# Patient Record
Sex: MALE | Race: WHITE | NOT HISPANIC OR LATINO | Employment: OTHER | URBAN - METROPOLITAN AREA
[De-identification: names, ages, dates, MRNs, and addresses within clinical notes are randomized per-mention and may not be internally consistent; named-entity substitution may affect disease eponyms.]

---

## 2024-01-02 ENCOUNTER — OFFICE VISIT (OUTPATIENT)
Dept: FAMILY MEDICINE | Facility: CLINIC | Age: 69
End: 2024-01-02
Payer: MEDICARE

## 2024-01-02 ENCOUNTER — LAB VISIT (OUTPATIENT)
Dept: LAB | Facility: CLINIC | Age: 69
End: 2024-01-02
Payer: MEDICARE

## 2024-01-02 VITALS
TEMPERATURE: 98 F | DIASTOLIC BLOOD PRESSURE: 70 MMHG | SYSTOLIC BLOOD PRESSURE: 130 MMHG | HEIGHT: 72 IN | OXYGEN SATURATION: 99 % | WEIGHT: 210.63 LBS | HEART RATE: 65 BPM | BODY MASS INDEX: 28.53 KG/M2

## 2024-01-02 DIAGNOSIS — I10 ESSENTIAL HYPERTENSION, BENIGN: ICD-10-CM

## 2024-01-02 DIAGNOSIS — Z00.00 PREVENTATIVE HEALTH CARE: ICD-10-CM

## 2024-01-02 DIAGNOSIS — Z12.12 SCREENING FOR MALIGNANT NEOPLASM OF THE RECTUM: ICD-10-CM

## 2024-01-02 DIAGNOSIS — M54.32 SCIATICA OF LEFT SIDE: ICD-10-CM

## 2024-01-02 DIAGNOSIS — Z88.9 DRUG ALLERGY: ICD-10-CM

## 2024-01-02 DIAGNOSIS — E78.2 MIXED HYPERLIPIDEMIA: ICD-10-CM

## 2024-01-02 DIAGNOSIS — R73.9 ELEVATED BLOOD SUGAR: ICD-10-CM

## 2024-01-02 DIAGNOSIS — E78.2 MIXED HYPERLIPIDEMIA: Primary | ICD-10-CM

## 2024-01-02 DIAGNOSIS — Z11.59 ENCOUNTER FOR HEPATITIS C SCREENING TEST FOR LOW RISK PATIENT: ICD-10-CM

## 2024-01-02 DIAGNOSIS — N40.0 BENIGN PROSTATIC HYPERPLASIA WITHOUT LOWER URINARY TRACT SYMPTOMS: ICD-10-CM

## 2024-01-02 LAB
ALBUMIN SERPL BCP-MCNC: 4.6 G/DL (ref 3.5–5.2)
ALBUMIN/CREAT UR: 5 UG/MG (ref 0–30)
ALP SERPL-CCNC: 79 U/L (ref 55–135)
ALT SERPL W/O P-5'-P-CCNC: 22 U/L (ref 10–44)
ANION GAP SERPL CALC-SCNC: 11 MMOL/L (ref 8–16)
AST SERPL-CCNC: 27 U/L (ref 10–40)
BILIRUB SERPL-MCNC: 0.6 MG/DL (ref 0.1–1)
BUN SERPL-MCNC: 11 MG/DL (ref 8–23)
CALCIUM SERPL-MCNC: 9.4 MG/DL (ref 8.7–10.5)
CHLORIDE SERPL-SCNC: 101 MMOL/L (ref 95–110)
CHOLEST SERPL-MCNC: 185 MG/DL (ref 120–199)
CHOLEST/HDLC SERPL: 3.1 {RATIO} (ref 2–5)
CO2 SERPL-SCNC: 25 MMOL/L (ref 23–29)
COMPLEXED PSA SERPL-MCNC: 0.69 NG/ML (ref 0–4)
CREAT SERPL-MCNC: 1.2 MG/DL (ref 0.5–1.4)
CREAT UR-MCNC: 101 MG/DL (ref 23–375)
EST. GFR  (NO RACE VARIABLE): >60 ML/MIN/1.73 M^2
ESTIMATED AVG GLUCOSE: 105 MG/DL (ref 68–131)
GLUCOSE SERPL-MCNC: 101 MG/DL (ref 70–110)
HBA1C MFR BLD: 5.3 % (ref 4–5.6)
HCV AB SERPL QL IA: NORMAL
HDLC SERPL-MCNC: 59 MG/DL (ref 40–75)
HDLC SERPL: 31.9 % (ref 20–50)
LDLC SERPL CALC-MCNC: 108 MG/DL (ref 63–159)
MICROALBUMIN UR DL<=1MG/L-MCNC: 5 UG/ML
NONHDLC SERPL-MCNC: 126 MG/DL
POTASSIUM SERPL-SCNC: 4.1 MMOL/L (ref 3.5–5.1)
PROT SERPL-MCNC: 7.5 G/DL (ref 6–8.4)
SODIUM SERPL-SCNC: 137 MMOL/L (ref 136–145)
TRIGL SERPL-MCNC: 90 MG/DL (ref 30–150)

## 2024-01-02 PROCEDURE — 86803 HEPATITIS C AB TEST: CPT | Performed by: INTERNAL MEDICINE

## 2024-01-02 PROCEDURE — 99214 OFFICE O/P EST MOD 30 MIN: CPT | Mod: S$GLB,,, | Performed by: INTERNAL MEDICINE

## 2024-01-02 PROCEDURE — 83036 HEMOGLOBIN GLYCOSYLATED A1C: CPT | Performed by: INTERNAL MEDICINE

## 2024-01-02 PROCEDURE — 3008F BODY MASS INDEX DOCD: CPT | Mod: CPTII,S$GLB,, | Performed by: INTERNAL MEDICINE

## 2024-01-02 PROCEDURE — 36415 COLL VENOUS BLD VENIPUNCTURE: CPT | Mod: ,,, | Performed by: INTERNAL MEDICINE

## 2024-01-02 PROCEDURE — 80061 LIPID PANEL: CPT | Performed by: INTERNAL MEDICINE

## 2024-01-02 PROCEDURE — 4010F ACE/ARB THERAPY RXD/TAKEN: CPT | Mod: CPTII,S$GLB,, | Performed by: INTERNAL MEDICINE

## 2024-01-02 PROCEDURE — 3078F DIAST BP <80 MM HG: CPT | Mod: CPTII,S$GLB,, | Performed by: INTERNAL MEDICINE

## 2024-01-02 PROCEDURE — 3075F SYST BP GE 130 - 139MM HG: CPT | Mod: CPTII,S$GLB,, | Performed by: INTERNAL MEDICINE

## 2024-01-02 PROCEDURE — 82043 UR ALBUMIN QUANTITATIVE: CPT | Performed by: INTERNAL MEDICINE

## 2024-01-02 PROCEDURE — 1160F RVW MEDS BY RX/DR IN RCRD: CPT | Mod: CPTII,S$GLB,, | Performed by: INTERNAL MEDICINE

## 2024-01-02 PROCEDURE — 1159F MED LIST DOCD IN RCRD: CPT | Mod: CPTII,S$GLB,, | Performed by: INTERNAL MEDICINE

## 2024-01-02 PROCEDURE — 1126F AMNT PAIN NOTED NONE PRSNT: CPT | Mod: CPTII,S$GLB,, | Performed by: INTERNAL MEDICINE

## 2024-01-02 PROCEDURE — 80053 COMPREHEN METABOLIC PANEL: CPT | Performed by: INTERNAL MEDICINE

## 2024-01-02 PROCEDURE — 84153 ASSAY OF PSA TOTAL: CPT | Performed by: INTERNAL MEDICINE

## 2024-01-02 RX ORDER — LISINOPRIL 10 MG/1
10 TABLET ORAL DAILY
COMMUNITY

## 2024-01-02 RX ORDER — METOPROLOL SUCCINATE 100 MG/1
100 TABLET, EXTENDED RELEASE ORAL DAILY
COMMUNITY

## 2024-01-02 NOTE — ASSESSMENT & PLAN NOTE
We discussed vaccinations  He got Flu , shingles already  I recommended RSV, prevnar 20, COVID & Tdap  Order cologuard  Get lipids, PSA & A1c

## 2024-01-02 NOTE — PROGRESS NOTES
Subjective:       Patient ID: German Casey is a 68 y.o. male.    Chief Complaint: Annual Exam, Establish Care, and Back Pain      Patient is established already .......... presents today for a f/u visit , to discuss getting new labs  and for management of the chronic conditions , Sciatica         Back Pain  This is a chronic problem. The current episode started more than 1 year ago. The problem occurs intermittently. The problem has been gradually worsening since onset. The pain is present in the lumbar spine. The quality of the pain is described as aching. The pain radiates to the left knee, left thigh and left foot. Pertinent negatives include no fever. (Weak L foot) Risk factors include lack of exercise, poor posture and sedentary lifestyle. He has tried NSAIDs for the symptoms. The treatment provided mild relief.     Review of Systems   Constitutional:  Negative for activity change, appetite change and fever.   Respiratory: Negative.     Cardiovascular: Negative.    Gastrointestinal: Negative.    Musculoskeletal:  Positive for back pain.         Objective:      Physical Exam  Vitals and nursing note reviewed.   Constitutional:       Appearance: Normal appearance.   Cardiovascular:      Rate and Rhythm: Normal rate and regular rhythm.      Pulses: Normal pulses.      Heart sounds: Normal heart sounds. No murmur heard.     No friction rub. No gallop.   Pulmonary:      Effort: Pulmonary effort is normal.      Breath sounds: Normal breath sounds. No wheezing.   Abdominal:      General: Abdomen is flat. Bowel sounds are normal.      Palpations: Abdomen is soft.   Musculoskeletal:         General: Normal range of motion.   Skin:     General: Skin is warm and dry.   Neurological:      General: No focal deficit present.      Mental Status: He is alert and oriented to person, place, and time.   Psychiatric:         Mood and Affect: Mood normal.         Assessment:       1. Mixed hyperlipidemia  -     Lipid Panel;  Future; Expected date: 01/02/2024    2. Essential hypertension, benign  Overview:  Well controlled on ace inh , B blocker    Assessment & Plan:  Diet , wt loss , exercise d/w patient  Cont POC  [facility]    Orders:  -     Comprehensive Metabolic Panel; Future; Expected date: 01/02/2024  -     Microalbumin/Creatinine Ratio, Urine; Future; Expected date: 01/02/2024    3. Benign prostatic hyperplasia without lower urinary tract symptoms  -     Prostate Specific Antigen, Diagnostic; Future; Expected date: 01/02/2024    4. Encounter for hepatitis C screening test for low risk patient  -     Hepatitis C Antibody; Future; Expected date: 01/02/2024    5. Elevated blood sugar  -     Hemoglobin A1C; Standing    6. Screening for malignant neoplasm of the rectum  -     Cologuard Screening (Multitarget Stool DNA); Future; Expected date: 01/02/2024    7. Sciatica of left side  Overview:  No previous w/u    Assessment & Plan:  Refer to n/s b/o h/o drop foot  Motrin  Heating pads  Voltaren gel qid prn OTC    Orders:  -     Ambulatory referral/consult to Neurosurgery; Future; Expected date: 01/09/2024    8. Drug allergy  -     Toxicology screen, urine    9. Preventative health care  Overview:  Get AWV next    Assessment & Plan:  We discussed vaccinations  He got Flu , shingles already  I recommended RSV, prevnar 20, COVID & Tdap  Order cologuard  Get lipids, PSA & A1c             Plan:       1. Mixed hyperlipidemia  -     Lipid Panel; Future; Expected date: 01/02/2024    2. Essential hypertension, benign  Overview:  Well controlled on ace inh , B blocker    Assessment & Plan:  Diet , wt loss , exercise d/w patient  Cont POC  [facility]    Orders:  -     Comprehensive Metabolic Panel; Future; Expected date: 01/02/2024  -     Microalbumin/Creatinine Ratio, Urine; Future; Expected date: 01/02/2024    3. Benign prostatic hyperplasia without lower urinary tract symptoms  -     Prostate Specific Antigen, Diagnostic;  Future; Expected date: 01/02/2024    4. Encounter for hepatitis C screening test for low risk patient  -     Hepatitis C Antibody; Future; Expected date: 01/02/2024    5. Elevated blood sugar  -     Hemoglobin A1C; Standing    6. Screening for malignant neoplasm of the rectum  -     Cologuard Screening (Multitarget Stool DNA); Future; Expected date: 01/02/2024    7. Sciatica of left side  Overview:  No previous w/u    Assessment & Plan:  Refer to n/s b/o h/o drop foot  Motrin  Heating pads  Voltaren gel qid prn OTC    Orders:  -     Ambulatory referral/consult to Neurosurgery; Future; Expected date: 01/09/2024    8. Drug allergy  -     Toxicology screen, urine    9. Preventative health care  Overview:  Get AWV next    Assessment & Plan:  We discussed vaccinations  He got Flu , shingles already  I recommended RSV, prevnar 20, COVID & Tdap  Order cologuard  Get lipids, PSA & A1c

## 2024-01-08 ENCOUNTER — PATIENT MESSAGE (OUTPATIENT)
Dept: FAMILY MEDICINE | Facility: CLINIC | Age: 69
End: 2024-01-08
Payer: MEDICARE

## 2024-01-22 LAB — NONINV COLON CA DNA+OCC BLD SCRN STL QL: NEGATIVE

## 2024-02-02 ENCOUNTER — OFFICE VISIT (OUTPATIENT)
Dept: FAMILY MEDICINE | Facility: CLINIC | Age: 69
End: 2024-02-02
Payer: MEDICARE

## 2024-02-02 VITALS
DIASTOLIC BLOOD PRESSURE: 78 MMHG | BODY MASS INDEX: 27.66 KG/M2 | WEIGHT: 204.19 LBS | HEIGHT: 72 IN | SYSTOLIC BLOOD PRESSURE: 128 MMHG

## 2024-02-02 DIAGNOSIS — Z00.00 ENCOUNTER FOR ANNUAL WELLNESS VISIT (AWV) IN MEDICARE PATIENT: ICD-10-CM

## 2024-02-02 DIAGNOSIS — I10 ESSENTIAL HYPERTENSION, BENIGN: ICD-10-CM

## 2024-02-02 DIAGNOSIS — Z23 NEED FOR PROPHYLACTIC VACCINATION WITH COMBINED DIPHTHERIA-TETANUS-PERTUSSIS (DTP) VACCINE: Primary | ICD-10-CM

## 2024-02-02 DIAGNOSIS — Z86.711 HISTORY OF PULMONARY EMBOLISM: ICD-10-CM

## 2024-02-02 PROCEDURE — 3044F HG A1C LEVEL LT 7.0%: CPT | Mod: CPTII,S$GLB,, | Performed by: INTERNAL MEDICINE

## 2024-02-02 PROCEDURE — 1126F AMNT PAIN NOTED NONE PRSNT: CPT | Mod: CPTII,S$GLB,, | Performed by: INTERNAL MEDICINE

## 2024-02-02 PROCEDURE — 90471 IMMUNIZATION ADMIN: CPT | Mod: S$GLB,,, | Performed by: INTERNAL MEDICINE

## 2024-02-02 PROCEDURE — 3061F NEG MICROALBUMINURIA REV: CPT | Mod: CPTII,S$GLB,, | Performed by: INTERNAL MEDICINE

## 2024-02-02 PROCEDURE — 4010F ACE/ARB THERAPY RXD/TAKEN: CPT | Mod: CPTII,S$GLB,, | Performed by: INTERNAL MEDICINE

## 2024-02-02 PROCEDURE — 1159F MED LIST DOCD IN RCRD: CPT | Mod: CPTII,S$GLB,, | Performed by: INTERNAL MEDICINE

## 2024-02-02 PROCEDURE — 90715 TDAP VACCINE 7 YRS/> IM: CPT | Mod: S$GLB,,, | Performed by: INTERNAL MEDICINE

## 2024-02-02 PROCEDURE — 3074F SYST BP LT 130 MM HG: CPT | Mod: CPTII,S$GLB,, | Performed by: INTERNAL MEDICINE

## 2024-02-02 PROCEDURE — 3008F BODY MASS INDEX DOCD: CPT | Mod: CPTII,S$GLB,, | Performed by: INTERNAL MEDICINE

## 2024-02-02 PROCEDURE — 1160F RVW MEDS BY RX/DR IN RCRD: CPT | Mod: CPTII,S$GLB,, | Performed by: INTERNAL MEDICINE

## 2024-02-02 PROCEDURE — 1101F PT FALLS ASSESS-DOCD LE1/YR: CPT | Mod: CPTII,S$GLB,, | Performed by: INTERNAL MEDICINE

## 2024-02-02 PROCEDURE — 1158F ADVNC CARE PLAN TLK DOCD: CPT | Mod: CPTII,S$GLB,, | Performed by: INTERNAL MEDICINE

## 2024-02-02 PROCEDURE — 3288F FALL RISK ASSESSMENT DOCD: CPT | Mod: CPTII,S$GLB,, | Performed by: INTERNAL MEDICINE

## 2024-02-02 PROCEDURE — 3078F DIAST BP <80 MM HG: CPT | Mod: CPTII,S$GLB,, | Performed by: INTERNAL MEDICINE

## 2024-02-02 PROCEDURE — G0439 PPPS, SUBSEQ VISIT: HCPCS | Mod: S$GLB,,, | Performed by: INTERNAL MEDICINE

## 2024-02-02 PROCEDURE — 3066F NEPHROPATHY DOC TX: CPT | Mod: CPTII,S$GLB,, | Performed by: INTERNAL MEDICINE

## 2024-02-02 NOTE — PROGRESS NOTES
Subjective:       Patient ID: German Casey is a 69 y.o. male.    Chief Complaint: Follow-up (Follow up )      Annual Medicare wellness visit :  Reported by patient.  Diet and nutrition:  Healthy diet  Fracture risk:  No history of fractures; no recent explain infection; no sudden unexplained fractures; previous musculoskeletal injuries  Physical activity:  Exercise on a regular basis; recent increase in physical activity; good  Physical condition  Depression risk:  Never feels sad, empty or tearful; no loss of interest in activities; no significant changes in weight; no sleep disturbances or insomnia; no agitation; no loss of energy; no feelings of worthlessness or guilt; no thoughts of suicide; no history of depression; no history of mood disorders  Orientation:  No disorientation to time; no disorientation to date; no disorientation to place  Concentration and memory:  No decreased concentration ability; no memory lapses or loss; does not forget words  Speech/motor difficulties:  No speech difficulties; no difficulty expressing formulated concepts; no difficulty with fine manipulative tasks; no difficulty writing/coping; no slowed reaction time/; does not knock things over with trying to pick them up  Hearing: No loss of hearing  Vision:  No vision problems  Activities of daily living:  Able to bathe with limited or no assistance; able to control urination and bowels; able to dress with limited or no assistance; able to feed herself with limited or no assistance; able to get out of chair or bed with limited or no assistance; able to Groom with limited or no assistance; able to toilet with limited or no assistance  Instrumental activities of daily living: Able to do housework with limited or no assistance; able to grocery shop with limited or no assistance; able to manage medications with limited or no assistance; able to manage money with limited or no assistance; able to prepare meals with limited or no  assistance; able to use the phone with limited or no assistance  Falls risk assessment: No frequent falls while walking; no 4 in the past year; no falls since last visit; no dizziness/vertigo  Home safety:  No unsafe mely hazards; no unsafe stairs; no unsafe gas appliances; Working smoke/CO detectors; wears protective head gear for biking/high velocity; use of seatbelts; practicing 'safer sex'; no vision or hearing loss while driving; no firearms; has hand bars in the bathroom/shower; good lighting in the home      Follow-up    Hypertension  This is a chronic problem. The current episode started more than 1 year ago. The problem has been resolved since onset. The problem is controlled. There are no associated agents to hypertension. Risk factors for coronary artery disease include family history. Past treatments include beta blockers and ACE inhibitors. The current treatment provides significant improvement. There are no compliance problems.      Review of Systems Review of system:  Patient reports no dry skin, no itching, no abnormal moles, no jaundice, no rashes, no hives, no discoloration, no excessive facial or body hair between bracket hirsutism, no hair thinning, no growth/lesions, and no excessive sweating; patient reports no fever, no chills, no night sweats, no significant weight gain, no significant weight loss, no exercise intolerance  Patient reports normal appetite and no sleep disturbances (insomnia)  Patient reports no dry eyes, no irritation, no pain in the eyes, no visual changes, no floaters, no sensitivity to light between bracket photophobia, no seeing double between bracket diplopia, and  No discharge.  Patient reports no difficulty hearing, no ear pain, no vertigo and no ringing in the ears between bracket tinnitus.  Patient reports no difficulty smelling, no frequent nosebleeds, and no nose/sinus  Problems.  Patient reports no dry mouth, no unusual taste of foods, no mouth ulcers, no  bleeding gums, and oral abnormalities and no teeth problem  Patient reports no sore throat, no difficulty swallowing between bracket dysphagia, no anterior neck pain/tenderness, no snoring no change in voice.  Patient reports no chest pain, no arm pain on exertion, no shortness of breath when walking, no shortness of breath when lying down, no palpitations, no known heart murmur, no lightheadedness, no calf or jaw pains, and no ankle edema.  Patient reports no cough, no nasal congestion, no runny nose, no sinus pressure, no wheezing, no frequent sneezing, no shortness of breath, no rapid breathing, no sputum production and no coughing up blood  Patient reports no nausea, no vomiting, no vomiting blood, no abdominal pain, normal appetite, no diarrhea, no constipation, no dyspepsia/reflux, no heartburn, no early satiety, complete emptying of stool cup, no bloating, able to restrain bowel movements, no bowel urgency and no blood in stools/on toilet paper.  Patient reports no pain during urination, no incontinence, no difficulty urinating, no hematuria, no increased frequency, no feelings of urgency, no flank pains and no urinary tract infections  Patient reports no muscle aches, no muscle weakness, no muscle cramps, no arthralgias/joint pain, no back pains, no swelling in the extremities and no difficulty walking.  Patient reports in dependency.  Patient reports no loss of consciousness, no slurred speech, no weakness, no numbness, no tingling, no tremors, no seizures, no dizziness, no headaches, no memory lapses or changes, no difficulty finding desired words, no loss of balance or falls  Patient reports no irritability, no depression, no anxiety, no panic attacks, no sleep disturbances, feeling safe in her relationship, no paranoia and no thoughts about suicide  Patient reports no fatigue, no cold intolerance, no heat intolerance and no unusual body odor  Patient reports no bruising, no swollen glands, no clotting  problems, and no bleeding disorders    Review for Opioid Screening: Pt does not have Rx for Opioids (If patient has Rx list here)      Review for Substance Use Disorders: Patient does not use substance (If patient has Rx list here)           Objective:      Physical Exam  Physical Exam:  Patient is a  year old   Constitutional:  General appearance:  Healthy appearing, well nourished, well developed, and well groomed.  Level of distress:  NAD.  Ambulation:  Ambulating normally.  Psychiatric:  Insight:  Good judgment.  Mental status:  Normal mood and affect an active and alert.  Orientation:  To time, place and person.  Memory:  Recent memory  Normal and remote memory normal.  Head:  Normocephalic, atraumatic, symmetrical, no tenderness, and hair is evenly distributed.  Eyes:  Lids and conjunctivae:  No discharge, pallor or redness and noninjected.  Pupils:  Kept it PERRLA.  Corneas: Grossly intact and fluorescein stain normal.  Funduscopic examination:  Normal vessels and optic discs, no exudates or hemorrhages and grossly normal except where noted.  Capital E OM: Intact.  Lungs: Clear.  Sclera: Nonicteric.  Vision: Peripheral vision grossly intact in acuity grossly intact.  Optic disc:  Normal appearance and vessels and no exudates or hemorrhages  ENMT:  Ears:  No lesions on external ear, EACs clear.  TMs clear.  TM mobility normal and normal general appearance.  Hearing:  No hearing loss and Rinne: AC > BC.  Nose:  No polyps, lesions on external nose, septal deviation, sinus tenderness, or nasal discharge; nasal passages clear mucosa pink; and nares patent.  Lips teeth and gums: No mouth or lip ulcers or bleeding.  Gums are normal dentition normal.  Oropharynx:  No erythema exudates or ulcers and moist mucous membranes.  Tonsils not enlarged.  Oral mucosa and salivary glands normal  Neck:  Neck is supple, symmetrical, trachea midline and no masses.  Lymph nodes:  No cervical lymphadenopathy.  Thyroid no enlargement  or nodules and nontender  Lungs:  Respiratory effort no dyspnea.  Percussion: No dullness, flatness or hyper-resonance.  Auscultation:  No wheezing, rales/crackles, no rhonchi and breath sounds normal, good air movement, and clear to auscultation except as noted.  Chest deformity: normal thoracic no deformities  Cardiovascular:  Apical pulse: Nondisplaced.  Heart auscultation:  Normal S1 and S2; no murmurs rubs or gallops; and RRR.  Neck vessels: No carotid bruit on the right or left and no JVDs or hepatojugular reflux.  Arterial pulses normal on the right and left radial femoral and dorsalis pedis and posterior tibial.  Varicosities right and left non-existent and capillary refill test normal.  Edema none: on the right or left  Breast: not applicable  Abdomen:  Bowel sounds normal.  Inspection and palpation:  No tenderness guarding masses rebound tenderness or CVA tenderness and soft and nondistended.  Liver column nontender and no hepatomegaly.  Spleen:  Nontender and no splenomegaly.  Hernia: Nonpalpable  Male :  Deferred  Rectal:  No hemorrhoids fissures masses and normal tone and stool heme-negative  Musculoskeletal:  Motor strength and tone:  Normal and normal tone.  Joints bones and muscles:  No contractures malalignment, tenderness or bony abnormalities and normal movement of all extremities and posture is normal.  Extremities:  No cyanosis clubbing or palpable cords  Neurological examination: Gait and station:  Normal gait and station.  Cranial nerves:  Grossly intact.  Sensation:  Monofilament test intact and normal and grossly intact.  Reflexes:  DTRs 2+ bilaterally throughout.  Coordination and cerebellum: no intention tremors, no resting tremors. Romberg's sign: negative. Motor strength normal on the right and left.  Sensation normal on the right and left side.  No pain/temperature decrease on the legs and dorsum of the feet.  No tactile decreased on the leg and dorsum of the feet.  No vibration-  perception threshold decrease  Skin:  Inspection and palpation: No rash, lesions, ulcers, nodules, jaundice or abnormal nevi and good turgor.  Nails:  Normal.  Right and left lower extremities column normal  Back: Thoracolumbar appearance: Normal curvature  Foot examination:  Right and left feet were examined, and toes were examined:  No deformity, inter digital erythema, or nail changes or disorders and digital hair present and normal motion.       Assessment:       1. Need for prophylactic vaccination with combined diphtheria-tetanus-pertussis (DTP) vaccine  -     Tdap (BOOSTRIX) vaccine injection 0.5 mL    2. Encounter for annual wellness visit (AWV) in Medicare patient  Overview:  AWV     Assessment & Plan:  T-dap today  Get COVID , prevnar 20 vaccines  Get annual eye exam with Dr John Brito: neg last year  Diet , exercise d/w patient        3. Essential hypertension, benign  Overview:  Well controlled on ace inh , B blocker    Assessment & Plan:  Monitor  Renal fx : normal      4. History of pulmonary embolism  Overview:  Stable  [facility]    Assessment & Plan:  On low dose xarelto  T/c stopping anticoagulant since his incidence was provoked, about 17 years ago with no other incidences, blood clotting d/o,..             Plan:       1. Need for prophylactic vaccination with combined diphtheria-tetanus-pertussis (DTP) vaccine  -     Tdap (BOOSTRIX) vaccine injection 0.5 mL    2. Encounter for annual wellness visit (AWV) in Medicare patient  Overview:  AWV     Assessment & Plan:  T-dap today  Get COVID , prevnar 20 vaccines  Get annual eye exam with Dr John Brito: neg last year  Diet , exercise d/w patient        3. Essential hypertension, benign  Overview:  Well controlled on ace inh , B blocker    Assessment & Plan:  Monitor  Renal fx : normal      4. History of pulmonary embolism  Overview:  Stable  [facility]    Assessment & Plan:  On low dose xarelto  T/c stopping anticoagulant since his  incidence was provoked, about 17 years ago with no other incidences, blood clotting d/o,..            I offered to discuss end of life issues, including information on how to make advance directives that the patient could use to name someone who would make medical decisions on their behalf if they became too ill to make themselves.      __Patient declined       _X__Patient is interested, I provided paperwork and offered to discuss     Advance Care Planning     Date: 02/02/2024    Power of   I initiated the process of voluntary advance care planning today and explained the importance of this process to the patient.  I introduced the concept of advance directives to the patient, as well. Then the patient received detailed information about the importance of designating a Health Care Power of  (HCPOA). He was also instructed to communicate with this person about their wishes for future healthcare, should he become sick and lose decision-making capacity. The patient has not previously appointed a HCPOA. After our discussion, the patient has not decided to complete a HCPOA and has appointed his  Next of kin , health care agent:  unknown  & health care agent number:  unknown  I spent a total time of 15 minutes discussing this issue with the patient.

## 2024-02-02 NOTE — Clinical Note
Eye exam : 2/27/23 by Dr John Brito : neg and is in the system  I gave the patient written recommendations re the outstanding vaccinations.

## 2024-02-02 NOTE — ASSESSMENT & PLAN NOTE
T-dap today  Get COVID , prevnar 20 vaccines  Get annual eye exam with Dr John Brito: neg last year  Diet , exercise d/w patient

## 2024-02-02 NOTE — ASSESSMENT & PLAN NOTE
On low dose xarelto  T/c stopping anticoagulant since his incidence was provoked, about 17 years ago with no other incidences, blood clotting d/o,..

## 2024-05-06 PROBLEM — Z00.00 ENCOUNTER FOR ANNUAL WELLNESS VISIT (AWV) IN MEDICARE PATIENT: Status: RESOLVED | Noted: 2024-01-02 | Resolved: 2024-05-06

## 2024-05-30 ENCOUNTER — PATIENT MESSAGE (OUTPATIENT)
Dept: FAMILY MEDICINE | Facility: CLINIC | Age: 69
End: 2024-05-30
Payer: MEDICARE

## 2024-05-30 ENCOUNTER — TELEPHONE (OUTPATIENT)
Dept: FAMILY MEDICINE | Facility: CLINIC | Age: 69
End: 2024-05-30
Payer: MEDICARE

## 2024-05-30 NOTE — TELEPHONE ENCOUNTER
----- Message from Lexi Perry sent at 5/30/2024  3:32 PM CDT -----  Contact: PT  Type:  RX Refill Request    Who Called: PT   Refill or New Rx: NEW   RX Name and Strength:BUSPIRONE   Preferred Pharmacy with phone number:   [facility]  [address]  , [place] [address] [phone]  Local or Mail Order:LOCAL  Ordering Provider: N/A  Would the patient rather a call back or a response via [facility]? CALL   Best Call Back Number:[phone] (home)   Additional Information:  ANXIETY - UNABLE TO EXERCISE FOR A FEW MOTHS DUE TO BACK PAIN IS LEADING TO ANXIETY. NO CHEST PAINS OR SHORTNESS PF BREATH   THANK YOU

## 2024-05-31 ENCOUNTER — OFFICE VISIT (OUTPATIENT)
Dept: FAMILY MEDICINE | Facility: CLINIC | Age: 69
End: 2024-05-31
Payer: MEDICARE

## 2024-05-31 VITALS
BODY MASS INDEX: 27.09 KG/M2 | OXYGEN SATURATION: 98 % | RESPIRATION RATE: 20 BRPM | SYSTOLIC BLOOD PRESSURE: 120 MMHG | HEIGHT: 72 IN | DIASTOLIC BLOOD PRESSURE: 70 MMHG | WEIGHT: 200 LBS | HEART RATE: 78 BPM

## 2024-05-31 DIAGNOSIS — F41.9 ANXIETY: Primary | ICD-10-CM

## 2024-05-31 PROCEDURE — 3066F NEPHROPATHY DOC TX: CPT | Mod: CPTII,S$GLB,, | Performed by: INTERNAL MEDICINE

## 2024-05-31 PROCEDURE — 3078F DIAST BP <80 MM HG: CPT | Mod: CPTII,S$GLB,, | Performed by: INTERNAL MEDICINE

## 2024-05-31 PROCEDURE — 1126F AMNT PAIN NOTED NONE PRSNT: CPT | Mod: CPTII,S$GLB,, | Performed by: INTERNAL MEDICINE

## 2024-05-31 PROCEDURE — 3074F SYST BP LT 130 MM HG: CPT | Mod: CPTII,S$GLB,, | Performed by: INTERNAL MEDICINE

## 2024-05-31 PROCEDURE — 3008F BODY MASS INDEX DOCD: CPT | Mod: CPTII,S$GLB,, | Performed by: INTERNAL MEDICINE

## 2024-05-31 PROCEDURE — 1160F RVW MEDS BY RX/DR IN RCRD: CPT | Mod: CPTII,S$GLB,, | Performed by: INTERNAL MEDICINE

## 2024-05-31 PROCEDURE — 3061F NEG MICROALBUMINURIA REV: CPT | Mod: CPTII,S$GLB,, | Performed by: INTERNAL MEDICINE

## 2024-05-31 PROCEDURE — 1159F MED LIST DOCD IN RCRD: CPT | Mod: CPTII,S$GLB,, | Performed by: INTERNAL MEDICINE

## 2024-05-31 PROCEDURE — 1101F PT FALLS ASSESS-DOCD LE1/YR: CPT | Mod: CPTII,S$GLB,, | Performed by: INTERNAL MEDICINE

## 2024-05-31 PROCEDURE — 4010F ACE/ARB THERAPY RXD/TAKEN: CPT | Mod: CPTII,S$GLB,, | Performed by: INTERNAL MEDICINE

## 2024-05-31 PROCEDURE — G2211 COMPLEX E/M VISIT ADD ON: HCPCS | Mod: S$GLB,,, | Performed by: INTERNAL MEDICINE

## 2024-05-31 PROCEDURE — 3044F HG A1C LEVEL LT 7.0%: CPT | Mod: CPTII,S$GLB,, | Performed by: INTERNAL MEDICINE

## 2024-05-31 PROCEDURE — 99214 OFFICE O/P EST MOD 30 MIN: CPT | Mod: S$GLB,,, | Performed by: INTERNAL MEDICINE

## 2024-05-31 PROCEDURE — 3288F FALL RISK ASSESSMENT DOCD: CPT | Mod: CPTII,S$GLB,, | Performed by: INTERNAL MEDICINE

## 2024-05-31 RX ORDER — HYDROXYZINE PAMOATE 25 MG/1
25 CAPSULE ORAL
Qty: 30 CAPSULE | Refills: 2 | Status: SHIPPED | OUTPATIENT
Start: 2024-05-31 | End: 2024-08-29

## 2024-05-31 RX ORDER — SUMATRIPTAN 50 MG/1
50 TABLET, FILM COATED ORAL
COMMUNITY
Start: 2024-01-29

## 2024-05-31 RX ORDER — PRAVASTATIN SODIUM 40 MG/1
1 TABLET ORAL NIGHTLY
COMMUNITY
Start: 2024-04-23

## 2024-05-31 RX ORDER — ESCITALOPRAM OXALATE 10 MG/1
10 TABLET ORAL DAILY
Qty: 30 TABLET | Refills: 2 | Status: SHIPPED | OUTPATIENT
Start: 2024-05-31 | End: 2024-08-29

## 2024-05-31 NOTE — PROGRESS NOTES
Subjective:       Patient ID: German Casey is a 69 y.o. male.    Chief Complaint: Anxiety (Follow up anxiety)      Patient is established already .......... presents today for a f/u visit , to discuss inc anxiety, dep    Anxiety  Presents for initial visit. Onset was at an unknown time. The problem has been gradually worsening. Symptoms include decreased concentration, depressed mood, nervous/anxious behavior and restlessness. Symptoms occur most days. The severity of symptoms is moderate. Nothing aggravates the symptoms.     Treatments tried: remeron. The treatment provided no relief. Compliance with prior treatments has been poor. Prior compliance problems include difficulty with treatment plan, medication issues, insurance issues and pharmacy issues.     Review of Systems   Constitutional:  Negative for activity change, appetite change and fever.   Respiratory: Negative.     Cardiovascular: Negative.    Gastrointestinal: Negative.    Musculoskeletal: Negative.    Psychiatric/Behavioral:  Positive for decreased concentration. The patient is nervous/anxious.          Objective:      Physical Exam  Vitals and nursing note reviewed.   Constitutional:       Appearance: Normal appearance.   Cardiovascular:      Rate and Rhythm: Normal rate and regular rhythm.      Pulses: Normal pulses.      Heart sounds: Normal heart sounds. No murmur heard.     No friction rub. No gallop.   Pulmonary:      Effort: Pulmonary effort is normal.      Breath sounds: Normal breath sounds. No wheezing.   Abdominal:      General: Abdomen is flat. Bowel sounds are normal.      Palpations: Abdomen is soft.   Musculoskeletal:         General: Normal range of motion.   Skin:     General: Skin is warm and dry.   Neurological:      General: No focal deficit present.      Mental Status: He is alert and oriented to person, place, and time.   Psychiatric:         Mood and Affect: Mood normal.         Assessment:       1.  Anxiety  Overview:  Associated with depression  No SI or HI    Assessment & Plan:  Add Lexapro and vistaril  Monitor closely     Orders:  -     hydrOXYzine pamoate (VISTARIL) 25 MG Cap; Take 1 capsule (25 mg total) by mouth as needed (anxiety).  Dispense: 30 capsule; Refill: 2    Other orders  -     EScitalopram oxalate (LEXAPRO) 10 MG tablet; Take 1 tablet (10 mg total) by mouth once daily.  Dispense: 30 tablet; Refill: 2           Plan:       1. Anxiety  Overview:  Associated with depression  No SI or HI    Assessment & Plan:  Add Lexapro and vistaril  Monitor closely     Orders:  -     hydrOXYzine pamoate (VISTARIL) 25 MG Cap; Take 1 capsule (25 mg total) by mouth as needed (anxiety).  Dispense: 30 capsule; Refill: 2    Other orders  -     EScitalopram oxalate (LEXAPRO) 10 MG tablet; Take 1 tablet (10 mg total) by mouth once daily.  Dispense: 30 tablet; Refill: 2    Visit today included increased complexity associated with the care of the episodic problem.   I addressed and managing the longitudinal care of the patient due to the serious and/or complex managed problem(s).  .

## 2024-06-04 DIAGNOSIS — F41.9 ANXIETY AND DEPRESSION: Primary | ICD-10-CM

## 2024-06-04 DIAGNOSIS — F32.A ANXIETY AND DEPRESSION: Primary | ICD-10-CM

## 2024-06-05 ENCOUNTER — TELEPHONE (OUTPATIENT)
Dept: FAMILY MEDICINE | Facility: CLINIC | Age: 69
End: 2024-06-05
Payer: MEDICARE

## 2024-06-05 NOTE — TELEPHONE ENCOUNTER
----- Message from Itzel Miranda sent at 6/5/2024  9:38 AM CDT -----  Contact: self  Type:  Patient Returning Call    Who Called:  pt  Who Left Message for Patient:  yosvany  Does the patient know what this is regarding?:  yes  Best Call Back Number:  [phone]   Additional Information:  please call

## 2024-07-19 ENCOUNTER — PATIENT MESSAGE (OUTPATIENT)
Dept: FAMILY MEDICINE | Facility: CLINIC | Age: 69
End: 2024-07-19
Payer: MEDICARE

## 2025-02-28 DIAGNOSIS — I10 ESSENTIAL HYPERTENSION, BENIGN: ICD-10-CM

## 2025-04-07 ENCOUNTER — PATIENT MESSAGE (OUTPATIENT)
Dept: FAMILY MEDICINE | Facility: CLINIC | Age: 70
End: 2025-04-07
Payer: MEDICARE

## 2025-06-22 ENCOUNTER — PATIENT MESSAGE (OUTPATIENT)
Dept: INTERNAL MEDICINE | Facility: CLINIC | Age: 70
End: 2025-06-22
Payer: MEDICARE